# Patient Record
Sex: FEMALE | Race: OTHER | Employment: OTHER | URBAN - METROPOLITAN AREA
[De-identification: names, ages, dates, MRNs, and addresses within clinical notes are randomized per-mention and may not be internally consistent; named-entity substitution may affect disease eponyms.]

---

## 2018-04-01 ENCOUNTER — HOSPITAL ENCOUNTER (EMERGENCY)
Age: 28
Discharge: HOME | End: 2018-04-01
Attending: EMERGENCY MEDICINE | Admitting: EMERGENCY MEDICINE
Payer: OTHER GOVERNMENT

## 2018-04-01 VITALS
HEIGHT: 68 IN | OXYGEN SATURATION: 100 % | HEART RATE: 70 BPM | TEMPERATURE: 98.3 F | DIASTOLIC BLOOD PRESSURE: 80 MMHG | SYSTOLIC BLOOD PRESSURE: 118 MMHG | RESPIRATION RATE: 18 BRPM | WEIGHT: 163 LBS | BODY MASS INDEX: 24.71 KG/M2

## 2018-04-01 DIAGNOSIS — R10.84 ABDOMINAL PAIN, GENERALIZED: ICD-10-CM

## 2018-04-01 DIAGNOSIS — N30.00 ACUTE CYSTITIS WITHOUT HEMATURIA: Primary | ICD-10-CM

## 2018-04-01 LAB
ALBUMIN SERPL-MCNC: 3.4 G/DL (ref 3.4–5)
ALBUMIN/GLOB SERPL: 0.9 {RATIO} (ref 0.8–1.7)
ALP SERPL-CCNC: 50 U/L (ref 45–117)
ALT SERPL-CCNC: 44 U/L (ref 13–56)
ANION GAP SERPL CALC-SCNC: 8 MMOL/L (ref 3–18)
APPEARANCE UR: CLEAR
AST SERPL-CCNC: 27 U/L (ref 15–37)
BACTERIA URNS QL MICRO: ABNORMAL /HPF
BASOPHILS # BLD: 0 K/UL (ref 0–0.06)
BASOPHILS NFR BLD: 0 % (ref 0–2)
BILIRUB SERPL-MCNC: 0.3 MG/DL (ref 0.2–1)
BILIRUB UR QL: NEGATIVE
BUN SERPL-MCNC: 16 MG/DL (ref 7–18)
BUN/CREAT SERPL: 15 (ref 12–20)
CALCIUM SERPL-MCNC: 8.7 MG/DL (ref 8.5–10.1)
CHLORIDE SERPL-SCNC: 110 MMOL/L (ref 100–108)
CO2 SERPL-SCNC: 28 MMOL/L (ref 21–32)
COLOR UR: YELLOW
CREAT SERPL-MCNC: 1.05 MG/DL (ref 0.6–1.3)
DIFFERENTIAL METHOD BLD: ABNORMAL
EOSINOPHIL # BLD: 0.1 K/UL (ref 0–0.4)
EOSINOPHIL NFR BLD: 1 % (ref 0–5)
EPITH CASTS URNS QL MICRO: ABNORMAL /LPF (ref 0–5)
ERYTHROCYTE [DISTWIDTH] IN BLOOD BY AUTOMATED COUNT: 12 % (ref 11.6–14.5)
GLOBULIN SER CALC-MCNC: 3.8 G/DL (ref 2–4)
GLUCOSE SERPL-MCNC: 79 MG/DL (ref 74–99)
GLUCOSE UR STRIP.AUTO-MCNC: NEGATIVE MG/DL
HCT VFR BLD AUTO: 35.4 % (ref 35–45)
HGB BLD-MCNC: 12.1 G/DL (ref 12–16)
HGB UR QL STRIP: NEGATIVE
KETONES UR QL STRIP.AUTO: NEGATIVE MG/DL
LEUKOCYTE ESTERASE UR QL STRIP.AUTO: ABNORMAL
LIPASE SERPL-CCNC: 80 U/L (ref 73–393)
LYMPHOCYTES # BLD: 1.7 K/UL (ref 0.9–3.6)
LYMPHOCYTES NFR BLD: 33 % (ref 21–52)
MAGNESIUM SERPL-MCNC: 1.8 MG/DL (ref 1.6–2.6)
MCH RBC QN AUTO: 30.1 PG (ref 24–34)
MCHC RBC AUTO-ENTMCNC: 34.2 G/DL (ref 31–37)
MCV RBC AUTO: 88.1 FL (ref 74–97)
MONOCYTES # BLD: 0.5 K/UL (ref 0.05–1.2)
MONOCYTES NFR BLD: 9 % (ref 3–10)
NEUTS SEG # BLD: 2.9 K/UL (ref 1.8–8)
NEUTS SEG NFR BLD: 57 % (ref 40–73)
NITRITE UR QL STRIP.AUTO: NEGATIVE
PH UR STRIP: 8.5 [PH] (ref 5–8)
PLATELET # BLD AUTO: 156 K/UL (ref 135–420)
PMV BLD AUTO: 8.9 FL (ref 9.2–11.8)
POTASSIUM SERPL-SCNC: 4.5 MMOL/L (ref 3.5–5.5)
PROT SERPL-MCNC: 7.2 G/DL (ref 6.4–8.2)
PROT UR STRIP-MCNC: NEGATIVE MG/DL
RBC # BLD AUTO: 4.02 M/UL (ref 4.2–5.3)
RBC #/AREA URNS HPF: ABNORMAL /HPF (ref 0–5)
SERVICE CMNT-IMP: NORMAL
SODIUM SERPL-SCNC: 146 MMOL/L (ref 136–145)
SP GR UR REFRACTOMETRY: 1.01 (ref 1–1.03)
UROBILINOGEN UR QL STRIP.AUTO: 1 EU/DL (ref 0.2–1)
WBC # BLD AUTO: 5.1 K/UL (ref 4.6–13.2)
WBC URNS QL MICRO: ABNORMAL /HPF (ref 0–5)
WET PREP GENITAL: NORMAL
WET PREP GENITAL: NORMAL

## 2018-04-01 PROCEDURE — 80053 COMPREHEN METABOLIC PANEL: CPT | Performed by: EMERGENCY MEDICINE

## 2018-04-01 PROCEDURE — 99284 EMERGENCY DEPT VISIT MOD MDM: CPT

## 2018-04-01 PROCEDURE — 85025 COMPLETE CBC W/AUTO DIFF WBC: CPT | Performed by: EMERGENCY MEDICINE

## 2018-04-01 PROCEDURE — 83735 ASSAY OF MAGNESIUM: CPT | Performed by: EMERGENCY MEDICINE

## 2018-04-01 PROCEDURE — 87491 CHLMYD TRACH DNA AMP PROBE: CPT | Performed by: EMERGENCY MEDICINE

## 2018-04-01 PROCEDURE — 87210 SMEAR WET MOUNT SALINE/INK: CPT | Performed by: EMERGENCY MEDICINE

## 2018-04-01 PROCEDURE — 81001 URINALYSIS AUTO W/SCOPE: CPT | Performed by: EMERGENCY MEDICINE

## 2018-04-01 PROCEDURE — 87086 URINE CULTURE/COLONY COUNT: CPT | Performed by: EMERGENCY MEDICINE

## 2018-04-01 PROCEDURE — 83690 ASSAY OF LIPASE: CPT | Performed by: EMERGENCY MEDICINE

## 2018-04-01 RX ORDER — NITROFURANTOIN 25; 75 MG/1; MG/1
100 CAPSULE ORAL 2 TIMES DAILY
Qty: 10 CAP | Refills: 0 | Status: SHIPPED | OUTPATIENT
Start: 2018-04-01 | End: 2018-04-06

## 2018-04-01 NOTE — ED TRIAGE NOTES
Pt to ED via EMS for abdominal pain onset today at 1520 while watching TV. Pt denies N/V/D, reports pain is to the middle of her abdomen, rated 6/10. Sepsis Screening completed    (  )Patient meets SIRS criteria. ( x )Patient does not meet SIRS criteria.       SIRS Criteria is achieved when two or more of the following are present   Temperature < 96.8°F (36°C) or > 100.9°F (38.3°C)   Heart Rate > 90 beats per minute   Respiratory Rate > 20 breaths per minute   WBC count > 12,000 or <4,000 or > 10% bands

## 2018-04-01 NOTE — ED PROVIDER NOTES
EMERGENCY DEPARTMENT HISTORY AND PHYSICAL EXAM    Date: 4/1/2018  Patient Name: Maged George    History of Presenting Illness     Chief Complaint   Patient presents with    Abdominal Pain         History Provided By: Patient    Chief Complaint: abd pain  Duration: 1.5 Hours  Timing:  Improving  Location: cental abd  Quality: Sharp  Severity: Moderate  Associated Symptoms: syncope and pelvic pain    Additional History (Context):   5:00 PM  Jewel Marcus is a 33 y.o. female who presents to the emergency department C/O improving sharp central abd pain that radiates into back onset 1.5 hours ago after having sex. Associated sxs include pelvic pain. Pt mentions similar sx's while in [employer] training. LMP was 2 weeks ago. Pt was a virgin until one week ago, pt had sex one hour prior to symptom onset. Pt denies diarrhea, nausea, vomiting, abnormal vaginal discharge or bleeding, Hx of ovarian cyst, and any other sxs or complaints. She currently has no pain and feels back to normal.    PCP: Deyanira Loco MD        Past History     Past Medical History:  History reviewed. No pertinent past medical history. Past Surgical History:  History reviewed. No pertinent surgical history. Family History:  History reviewed. No pertinent family history. Social History:  Social History   Substance Use Topics    Smoking status: Never Smoker    Smokeless tobacco: Never Used    Alcohol use No       Allergies:  No Known Allergies      Review of Systems   Review of Systems   Gastrointestinal: Positive for abdominal pain. Negative for diarrhea, nausea and vomiting. Genitourinary: Positive for pelvic pain. Negative for vaginal bleeding and vaginal discharge. Neurological: Positive for syncope. All other systems reviewed and are negative.       Physical Exam     Vitals:    04/01/18 1626 04/01/18 1819   BP: 122/72 118/80   Pulse: (!) 58 70   Resp: 18 18   Temp: 98.3 °F (36.8 °C)    SpO2: 97% 100%   Weight: 73.9 kg (163 lb) Height: 5' 8\" (1.727 m)      Physical Exam   Nursing note and vitals reviewed. Constitutional: Alert. Well appearing, no acute distress  Head: Normocephalic, Atraumatic  Eyes: Pupils are equal, round, and reactive to light, EOMI  ENT: Moist mucous membranes, oropharynx clear. Neck: Supple, non-tender  Cardiovascular: Regular rate and rhythm, no murmurs, rubs, or gallops  Chest: Normal work of breathing and chest excursion bilaterally. No reproducible chest tenderness. Lungs: Clear to ausculation bilaterally. Abdomen: Soft, non tender, non distended, normoactive bowel sounds  Back: No evidence of trauma or deformity. No CVA Tenderness.   Extremities: No evidence of trauma or deformity, no LE edema  Skin: Warm and dry  Neuro: Alert and appropriate, facial movement symmetric, normal speech, strength and sensation full and symmetric bilaterally, normal gait, normal coordination  Psychiatric: Normal mood and affect     Diagnostic Study Results     Labs -     Recent Results (from the past 12 hour(s))   URINALYSIS W/ RFLX MICROSCOPIC    Collection Time: 04/01/18  4:30 PM   Result Value Ref Range    Color YELLOW      Appearance CLEAR      Specific gravity 1.015 1.005 - 1.030      pH (UA) 8.5 (H) 5.0 - 8.0      Protein NEGATIVE  NEG mg/dL    Glucose NEGATIVE  NEG mg/dL    Ketone NEGATIVE  NEG mg/dL    Bilirubin NEGATIVE  NEG      Blood NEGATIVE  NEG      Urobilinogen 1.0 0.2 - 1.0 EU/dL    Nitrites NEGATIVE  NEG      Leukocyte Esterase SMALL (A) NEG     URINE MICROSCOPIC ONLY    Collection Time: 04/01/18  4:30 PM   Result Value Ref Range    WBC 4 to 10 0 - 5 /hpf    RBC 0 to 3 0 - 5 /hpf    Epithelial cells 4+ 0 - 5 /lpf    Bacteria 1+ (A) NEG /hpf   CBC WITH AUTOMATED DIFF    Collection Time: 04/01/18  4:52 PM   Result Value Ref Range    WBC 5.1 4.6 - 13.2 K/uL    RBC 4.02 (L) 4.20 - 5.30 M/uL    HGB 12.1 12.0 - 16.0 g/dL    HCT 35.4 35.0 - 45.0 %    MCV 88.1 74.0 - 97.0 FL    MCH 30.1 24.0 - 34.0 PG    MCHC 34.2 31.0 - 37.0 g/dL    RDW 12.0 11.6 - 14.5 %    PLATELET 574 112 - 539 K/uL    MPV 8.9 (L) 9.2 - 11.8 FL    NEUTROPHILS 57 40 - 73 %    LYMPHOCYTES 33 21 - 52 %    MONOCYTES 9 3 - 10 %    EOSINOPHILS 1 0 - 5 %    BASOPHILS 0 0 - 2 %    ABS. NEUTROPHILS 2.9 1.8 - 8.0 K/UL    ABS. LYMPHOCYTES 1.7 0.9 - 3.6 K/UL    ABS. MONOCYTES 0.5 0.05 - 1.2 K/UL    ABS. EOSINOPHILS 0.1 0.0 - 0.4 K/UL    ABS. BASOPHILS 0.0 0.0 - 0.06 K/UL    DF AUTOMATED     MAGNESIUM    Collection Time: 04/01/18  4:52 PM   Result Value Ref Range    Magnesium 1.8 1.6 - 2.6 mg/dL   METABOLIC PANEL, COMPREHENSIVE    Collection Time: 04/01/18  4:52 PM   Result Value Ref Range    Sodium 146 (H) 136 - 145 mmol/L    Potassium 4.5 3.5 - 5.5 mmol/L    Chloride 110 (H) 100 - 108 mmol/L    CO2 28 21 - 32 mmol/L    Anion gap 8 3.0 - 18 mmol/L    Glucose 79 74 - 99 mg/dL    BUN 16 7.0 - 18 MG/DL    Creatinine 1.05 0.6 - 1.3 MG/DL    BUN/Creatinine ratio 15 12 - 20      GFR est AA >60 >60 ml/min/1.73m2    GFR est non-AA >60 >60 ml/min/1.73m2    Calcium 8.7 8.5 - 10.1 MG/DL    Bilirubin, total 0.3 0.2 - 1.0 MG/DL    ALT (SGPT) 44 13 - 56 U/L    AST (SGOT) 27 15 - 37 U/L    Alk. phosphatase 50 45 - 117 U/L    Protein, total 7.2 6.4 - 8.2 g/dL    Albumin 3.4 3.4 - 5.0 g/dL    Globulin 3.8 2.0 - 4.0 g/dL    A-G Ratio 0.9 0.8 - 1.7     LIPASE    Collection Time: 04/01/18  4:52 PM   Result Value Ref Range    Lipase 80 73 - 393 U/L   WET PREP    Collection Time: 04/01/18  5:15 PM   Result Value Ref Range    Special Requests: NO SPECIAL REQUESTS      Wet prep NO YEAST,TRICHOMONAS OR CLUE CELLS NOTED      Wet prep NO FUNGAL ELEMENTS SEEN         Radiologic Studies -  No orders to display     CT Results  (Last 48 hours)    None        CXR Results  (Last 48 hours)    None          Medications given in the ED-  Medications - No data to display      Medical Decision Making   I am the first provider for this patient.     I reviewed the vital signs, available nursing notes, past medical history, past surgical history, family history and social history. Vital Signs-Reviewed the patient's vital signs. Pulse Oximetry Analysis - 97% on RA     Procedures:  Procedures   PROCEDURE NOTE - PELVIC EXAM:    5:11 PM  Performed by: Louis Oliver MD  Pelvic exam was performed speculum. Further findings noted in physical exam. GC, chlamydia and wet prep collected and sent to lab. Procedure chaperoned by nursing staff. The procedure took 1-15 minutes, and pt tolerated poorly. Difficulty exam due to significant pt anxiety. I was unable to visualize cervix or perform bimanual exam  Written by Zelda Rivas ED Scribe, as dictated by Louis Oliver MD.        ED Course:   5:00 PM Initial assessment performed. The patients presenting problems have been discussed, and they are in agreement with the care plan formulated and outlined with them. I have encouraged them to ask questions as they arise throughout their visit. Diagnosis and Disposition     Discussion: 33 y.o female present for abd pain occurring after intercourse, resolved by the time pt arrived to ER. Abd exam benign. Labs reassuring for NAP. I was unable to complete full pelvic exam due to significant anxiety on the part of the patient and the cervix was not visualized at all. She understands importance of PCP and OBGYN follow up. She has UTI which will be treated. Return precautions provided. DISCHARGE NOTE:  6:25 PM  Trey George's  results have been reviewed with her. She has been counseled regarding her diagnosis, treatment, and plan. She verbally conveys understanding and agreement of the signs, symptoms, diagnosis, treatment and prognosis and additionally agrees to follow up as discussed. She also agrees with the care-plan and conveys that all of her questions have been answered.   I have also provided discharge instructions for her that include: educational information regarding their diagnosis and treatment, and list of reasons why they would want to return to the ED prior to their follow-up appointment, should her condition change. She has been provided with education for proper emergency department utilization. CLINICAL IMPRESSION:    1. Acute cystitis without hematuria    2. Abdominal pain, generalized        PLAN:  1. D/C Home  2. Discharge Medication List as of 4/1/2018  6:07 PM      START taking these medications    Details   nitrofurantoin, macrocrystal-monohydrate, (MACROBID) 100 mg capsule Take 1 Cap by mouth two (2) times a day for 5 days. , Print, Disp-10 Cap, R-0           3. Follow-up Information     Follow up With Details Comments Contact Info    [facility] [place] Schedule an appointment as soon as possible for a visit in 2 days  [phone]    [facility] EMERGENCY DEPT  As needed, If symptoms worsen [address] [address]  [phone]        _______________________________    Attestations: This note is prepared by Niels Starr, acting as Scribe for Joann Anglin MD.    Joann Anglin MD:  The scribe's documentation has been prepared under my direction and personally reviewed by me in its entirety.   I confirm that the note above accurately reflects all work, treatment, procedures, and medical decision making performed by me.  _______________________________

## 2018-04-01 NOTE — DISCHARGE INSTRUCTIONS
Abdominal Pain: Care Instructions  Your Care Instructions    Abdominal pain has many possible causes. Some aren't serious and get better on their own in a few days. Others need more testing and treatment. If your pain continues or gets worse, you need to be rechecked and may need more tests to find out what is wrong. You may need surgery to correct the problem. Don't ignore new symptoms, such as fever, nausea and vomiting, urination problems, pain that gets worse, and dizziness. These may be signs of a more serious problem. Your doctor may have recommended a follow-up visit in the next 8 to 12 hours. If you are not getting better, you may need more tests or treatment. The doctor has checked you carefully, but problems can develop later. If you notice any problems or new symptoms, get medical treatment right away. Follow-up care is a key part of your treatment and safety. Be sure to make and go to all appointments, and call your doctor if you are having problems. It's also a good idea to know your test results and keep a list of the medicines you take. How can you care for yourself at home? · Rest until you feel better. · To prevent dehydration, drink plenty of fluids, enough so that your urine is light yellow or clear like water. Choose water and other caffeine-free clear liquids until you feel better. If you have kidney, heart, or liver disease and have to limit fluids, talk with your doctor before you increase the amount of fluids you drink. · If your stomach is upset, eat mild foods, such as rice, dry toast or crackers, bananas, and applesauce. Try eating several small meals instead of two or three large ones. · Wait until 48 hours after all symptoms have gone away before you have spicy foods, alcohol, and drinks that contain caffeine. · Do not eat foods that are high in fat. · Avoid anti-inflammatory medicines such as aspirin, ibuprofen (Advil, Motrin), and naproxen (Aleve).  These can cause stomach upset. Talk to your doctor if you take daily aspirin for another health problem. When should you call for help? Call 911 anytime you think you may need emergency care. For example, call if:  ? · You passed out (lost consciousness). ? · You pass maroon or very bloody stools. ? · You vomit blood or what looks like coffee grounds. ? · You have new, severe belly pain. ?Call your doctor now or seek immediate medical care if:  ? · Your pain gets worse, especially if it becomes focused in one area of your belly. ? · You have a new or higher fever. ? · Your stools are black and look like tar, or they have streaks of blood. ? · You have unexpected vaginal bleeding. ? · You have symptoms of a urinary tract infection. These may include:  ¨ Pain when you urinate. ¨ Urinating more often than usual.  ¨ Blood in your urine. ? · You are dizzy or lightheaded, or you feel like you may faint. ? Watch closely for changes in your health, and be sure to contact your doctor if:  ? · You are not getting better after 1 day (24 hours). Where can you learn more? Go to http://gabrielleBig Rivermatt.info/. Enter D588 in the search box to learn more about \"Abdominal Pain: Care Instructions. \"  Current as of: March 20, 2017  Content Version: 11.4  © 0133-7741 [employer] Care instructions adapted under license by [employer] (which disclaims liability or warranty for this information). If you have questions about a medical condition or this instruction, always ask your healthcare professional. [place] [address] any warranty or liability for your use of this information. Urinary Tract Infection in Women: Care Instructions  Your Care Instructions    A urinary tract infection, or UTI, is a general term for an infection anywhere between the kidneys and the urethra (where urine comes out). Most UTIs are bladder infections.  They often cause pain or burning when you urinate. UTIs are caused by bacteria and can be cured with antibiotics. Be sure to complete your treatment so that the infection goes away. Follow-up care is a key part of your treatment and safety. Be sure to make and go to all appointments, and call your doctor if you are having problems. It's also a good idea to know your test results and keep a list of the medicines you take. How can you care for yourself at home? · Take your antibiotics as directed. Do not stop taking them just because you feel better. You need to take the full course of antibiotics. · Drink extra water and other fluids for the next day or two. This may help wash out the bacteria that are causing the infection. (If you have kidney, heart, or liver disease and have to limit fluids, talk with your doctor before you increase your fluid intake.)  · Avoid drinks that are carbonated or have caffeine. They can irritate the bladder. · Urinate often. Try to empty your bladder each time. · To relieve pain, take a hot bath or lay a heating pad set on low over your lower belly or genital area. Never go to sleep with a heating pad in place. To prevent UTIs  · Drink plenty of water each day. This helps you urinate often, which clears bacteria from your system. (If you have kidney, heart, or liver disease and have to limit fluids, talk with your doctor before you increase your fluid intake.)  · Urinate when you need to. · Urinate right after you have sex. · Change sanitary pads often. · Avoid douches, bubble baths, feminine hygiene sprays, and other feminine hygiene products that have deodorants. · After going to the bathroom, wipe from front to back. When should you call for help? Call your doctor now or seek immediate medical care if:  ? · Symptoms such as fever, chills, nausea, or vomiting get worse or appear for the first time. ? · You have new pain in your back just below your rib cage. This is called flank pain.    ? · There is new blood or pus in your urine. ? · You have any problems with your antibiotic medicine. ? Watch closely for changes in your health, and be sure to contact your doctor if:  ? · You are not getting better after taking an antibiotic for 2 days. ? · Your symptoms go away but then come back. Where can you learn more? Go to http://gabrielle-matt.info/. Enter Z242 in the search box to learn more about \"Urinary Tract Infection in Women: Care Instructions. \"  Current as of: May 12, 2017  Content Version: 11.4  © 8327-4222 [employer]. Care instructions adapted under license by [employer] (which disclaims liability or warranty for this information). If you have questions about a medical condition or this instruction, always ask your healthcare professional. [address] any warranty or liability for your use of this information.

## 2018-04-01 NOTE — ED NOTES
Pt resting in stretcher in NAD at this time. Call light within reach.  at bedside. Pt currently denies abdominal pain.

## 2018-04-02 LAB
BACTERIA SPEC CULT: NORMAL
C TRACH RRNA SPEC QL NAA+PROBE: NEGATIVE
N GONORRHOEA RRNA SPEC QL NAA+PROBE: NEGATIVE
SERVICE CMNT-IMP: NORMAL
SPECIMEN SOURCE: NORMAL

## 2018-10-02 ENCOUNTER — HOSPITAL ENCOUNTER (EMERGENCY)
Age: 28
Discharge: HOME | End: 2018-10-02
Attending: EMERGENCY MEDICINE
Payer: OTHER GOVERNMENT

## 2018-10-02 ENCOUNTER — APPOINTMENT (OUTPATIENT)
Dept: ULTRASOUND IMAGING | Age: 28
End: 2018-10-02
Attending: NURSE PRACTITIONER
Payer: OTHER GOVERNMENT

## 2018-10-02 VITALS
TEMPERATURE: 98.1 F | RESPIRATION RATE: 16 BRPM | BODY MASS INDEX: 28.79 KG/M2 | WEIGHT: 190 LBS | HEIGHT: 68 IN | OXYGEN SATURATION: 100 % | SYSTOLIC BLOOD PRESSURE: 111 MMHG | DIASTOLIC BLOOD PRESSURE: 71 MMHG | HEART RATE: 84 BPM

## 2018-10-02 DIAGNOSIS — N83.202 CYST OF LEFT OVARY: ICD-10-CM

## 2018-10-02 DIAGNOSIS — O26.891 ABDOMINAL PAIN DURING PREGNANCY IN FIRST TRIMESTER: Primary | ICD-10-CM

## 2018-10-02 DIAGNOSIS — R10.9 ABDOMINAL PAIN DURING PREGNANCY IN FIRST TRIMESTER: Primary | ICD-10-CM

## 2018-10-02 DIAGNOSIS — O41.8X10 SUBCHORIONIC HEMORRHAGE OF PLACENTA IN FIRST TRIMESTER, SINGLE OR UNSPECIFIED FETUS: ICD-10-CM

## 2018-10-02 DIAGNOSIS — O46.8X1 SUBCHORIONIC HEMORRHAGE OF PLACENTA IN FIRST TRIMESTER, SINGLE OR UNSPECIFIED FETUS: ICD-10-CM

## 2018-10-02 LAB
ABO + RH BLD: NORMAL
ALBUMIN SERPL-MCNC: 3.9 G/DL (ref 3.4–5)
ALBUMIN/GLOB SERPL: 0.9 {RATIO} (ref 0.8–1.7)
ALP SERPL-CCNC: 48 U/L (ref 45–117)
ALT SERPL-CCNC: 27 U/L (ref 13–56)
ANION GAP SERPL CALC-SCNC: 9 MMOL/L (ref 3–18)
APPEARANCE UR: CLEAR
AST SERPL-CCNC: 18 U/L (ref 15–37)
BACTERIA URNS QL MICRO: ABNORMAL /HPF
BASOPHILS # BLD: 0 K/UL (ref 0–0.1)
BASOPHILS NFR BLD: 0 % (ref 0–2)
BILIRUB SERPL-MCNC: 0.4 MG/DL (ref 0.2–1)
BILIRUB UR QL: NEGATIVE
BLOOD GROUP ANTIBODIES SERPL: NORMAL
BUN SERPL-MCNC: 9 MG/DL (ref 7–18)
BUN/CREAT SERPL: 13 (ref 12–20)
CALCIUM SERPL-MCNC: 9.4 MG/DL (ref 8.5–10.1)
CHLORIDE SERPL-SCNC: 99 MMOL/L (ref 100–108)
CO2 SERPL-SCNC: 27 MMOL/L (ref 21–32)
COLOR UR: YELLOW
CREAT SERPL-MCNC: 0.72 MG/DL (ref 0.6–1.3)
DIFFERENTIAL METHOD BLD: ABNORMAL
EOSINOPHIL # BLD: 0 K/UL (ref 0–0.4)
EOSINOPHIL NFR BLD: 0 % (ref 0–5)
EPITH CASTS URNS QL MICRO: ABNORMAL /LPF (ref 0–5)
ERYTHROCYTE [DISTWIDTH] IN BLOOD BY AUTOMATED COUNT: 12.3 % (ref 11.6–14.5)
GLOBULIN SER CALC-MCNC: 4.5 G/DL (ref 2–4)
GLUCOSE SERPL-MCNC: 85 MG/DL (ref 74–99)
GLUCOSE UR STRIP.AUTO-MCNC: NEGATIVE MG/DL
HCG SERPL QL: POSITIVE
HCG SERPL-ACNC: ABNORMAL MIU/ML (ref 1–6)
HCT VFR BLD AUTO: 38.1 % (ref 35–45)
HGB BLD-MCNC: 13.6 G/DL (ref 12–16)
HGB UR QL STRIP: NEGATIVE
KETONES UR QL STRIP.AUTO: NEGATIVE MG/DL
LEUKOCYTE ESTERASE UR QL STRIP.AUTO: ABNORMAL
LIPASE SERPL-CCNC: 73 U/L (ref 73–393)
LYMPHOCYTES # BLD: 1.1 K/UL (ref 0.9–3.6)
LYMPHOCYTES NFR BLD: 19 % (ref 21–52)
MCH RBC QN AUTO: 30.8 PG (ref 24–34)
MCHC RBC AUTO-ENTMCNC: 35.7 G/DL (ref 31–37)
MCV RBC AUTO: 86.4 FL (ref 74–97)
MONOCYTES # BLD: 0.3 K/UL (ref 0.05–1.2)
MONOCYTES NFR BLD: 5 % (ref 3–10)
NEUTS SEG # BLD: 4.3 K/UL (ref 1.8–8)
NEUTS SEG NFR BLD: 76 % (ref 40–73)
NITRITE UR QL STRIP.AUTO: NEGATIVE
PH UR STRIP: 6.5 [PH] (ref 5–8)
PLATELET # BLD AUTO: 171 K/UL (ref 135–420)
PMV BLD AUTO: 8.8 FL (ref 9.2–11.8)
POTASSIUM SERPL-SCNC: 3.9 MMOL/L (ref 3.5–5.5)
PROT SERPL-MCNC: 8.4 G/DL (ref 6.4–8.2)
PROT UR STRIP-MCNC: NEGATIVE MG/DL
RBC # BLD AUTO: 4.41 M/UL (ref 4.2–5.3)
RBC #/AREA URNS HPF: ABNORMAL /HPF (ref 0–5)
SERVICE CMNT-IMP: NORMAL
SODIUM SERPL-SCNC: 135 MMOL/L (ref 136–145)
SP GR UR REFRACTOMETRY: 1.02 (ref 1–1.03)
SPECIMEN EXP DATE BLD: NORMAL
UROBILINOGEN UR QL STRIP.AUTO: 1 EU/DL (ref 0.2–1)
WBC # BLD AUTO: 5.7 K/UL (ref 4.6–13.2)
WBC URNS QL MICRO: ABNORMAL /HPF (ref 0–5)
WET PREP GENITAL: NORMAL

## 2018-10-02 PROCEDURE — 99284 EMERGENCY DEPT VISIT MOD MDM: CPT

## 2018-10-02 PROCEDURE — 80053 COMPREHEN METABOLIC PANEL: CPT | Performed by: NURSE PRACTITIONER

## 2018-10-02 PROCEDURE — 87491 CHLMYD TRACH DNA AMP PROBE: CPT | Performed by: NURSE PRACTITIONER

## 2018-10-02 PROCEDURE — 87086 URINE CULTURE/COLONY COUNT: CPT | Performed by: NURSE PRACTITIONER

## 2018-10-02 PROCEDURE — 81001 URINALYSIS AUTO W/SCOPE: CPT | Performed by: EMERGENCY MEDICINE

## 2018-10-02 PROCEDURE — 86901 BLOOD TYPING SEROLOGIC RH(D): CPT | Performed by: NURSE PRACTITIONER

## 2018-10-02 PROCEDURE — 85025 COMPLETE CBC W/AUTO DIFF WBC: CPT | Performed by: NURSE PRACTITIONER

## 2018-10-02 PROCEDURE — 84703 CHORIONIC GONADOTROPIN ASSAY: CPT | Performed by: NURSE PRACTITIONER

## 2018-10-02 PROCEDURE — 87210 SMEAR WET MOUNT SALINE/INK: CPT | Performed by: NURSE PRACTITIONER

## 2018-10-02 PROCEDURE — 83690 ASSAY OF LIPASE: CPT | Performed by: NURSE PRACTITIONER

## 2018-10-02 PROCEDURE — 84702 CHORIONIC GONADOTROPIN TEST: CPT | Performed by: NURSE PRACTITIONER

## 2018-10-02 PROCEDURE — 76817 TRANSVAGINAL US OBSTETRIC: CPT

## 2018-10-02 RX ORDER — ACETAMINOPHEN 325 MG/1
650 TABLET ORAL
Status: DISCONTINUED | OUTPATIENT
Start: 2018-10-02 | End: 2018-10-02 | Stop reason: HOSPADM

## 2018-10-02 NOTE — DISCHARGE INSTRUCTIONS
Belly Pain in Pregnancy: Care Instructions  Your Care Instructions    When you're pregnant, any belly pain can be a worry. You may not want to call your doctor about every pain you have. But you don't want to miss something that is dangerous for you or your baby. Even if it feels familiar, belly pain can mean something new when you're pregnant. It's important to know when to call your doctor. It will also help to know how to care for yourself at home when your pain is not caused by anything harmful. · When belly pain is more severe or constant, see a doctor right away. · If you're sure your belly pain is a sign of labor, call your doctor. · When belly pain is brief, it's usually a normal part of pregnancy. It might be related to changes in the growing uterus. Or it could be the stretching of ligaments called round ligaments. These ligaments help support the uterus. Round ligament pain can be on either side of your belly. It can also be felt in your hips or groin. Follow-up care is a key part of your treatment and safety. Be sure to make and go to all appointments, and call your doctor if you are having problems. It's also a good idea to know your test results and keep a list of the medicines you take. How can you tell if belly pain is a sign of labor? When belly pain is caused by labor, it can feel like mild or menstrual-like cramps in your lower belly. These cramps are probably contractions. They can happen in your second or third trimester. You may also have:  · A steady, dull ache in your lower back, pelvis, or thighs. · A feeling of pressure in your pelvis or lower belly. · Changes in your vaginal discharge or a sudden release of fluid from the vagina. If you think you are in labor, call your doctor. How can you care for yourself at home? When belly pain is mild and is not a symptom of labor:  · Rest until you feel better. · Take a warm bath.   · Think about what you drink and eat:  ¨ Drink plenty of fluids. Choose water and other caffeine-free clear liquids until you feel better. ¨ Try eating small, frequent meals. If your stomach is upset, try bland, low-fat foods like plain rice, broiled chicken, toast, and yogurt. · Think about how you move if you are having brief pains from stretching of the round ligaments. ¨ Try gentle stretching. ¨ Move a little more slowly when turning in bed or getting up from a chair, so those ligaments don't stretch quickly. ¨ Lean forward a bit if you think you are going to cough or sneeze. When should you call for help? Call 911 anytime you think you may need emergency care. For example, call if:    · You have sudden, severe pain in your belly.     · You have severe vaginal bleeding.    Call your doctor now or seek immediate medical care if:    · You have new or worse belly pain or cramping.     · You have any vaginal bleeding.     · You have a fever.     · You have symptoms of preeclampsia, such as:  ¨ Sudden swelling of your face, hands, or feet. ¨ New vision problems (such as dimness or blurring). ¨ A severe headache.     · You think that you may be in labor. This means that you've had at least 8 contractions within 1 hour or at least 4 contractions within 20 minutes, even after you change your position and drink fluids.     · You have symptoms of a urinary tract infection. These may include:  ¨ Pain or burning when you urinate. ¨ A frequent need to urinate without being able to pass much urine. ¨ Pain in the flank, which is just below the rib cage and above the waist on either side of the back. ¨ Blood in your urine.    Watch closely for changes in your health, and be sure to contact your doctor if you are worried about your or your baby's health. Where can you learn more? Go to http://gabrielle-matt.info/. Enter 194 [id number] [id number] in the search box to learn more about \"Belly Pain in Pregnancy: Care Instructions. \"  Current as of: November 21, 2017  Content Version: 11.7  © 0248-5405 [employer]. Care instructions adapted under license by [employer] (which disclaims liability or warranty for this information). If you have questions about a medical condition or this instruction, always ask your healthcare professional. [address] any warranty or liability for your use of this information. Subchorionic Hematoma: Care Instructions  Your Care Instructions    A subchorionic hematoma or hemorrhage is bleeding under one of the membranes (chorion) that surrounds the embryo inside the uterus. It is a common cause of bleeding in early pregnancy. The main symptom is vaginal bleeding. But some women don't have symptoms. They may find out they have a hematoma during an ultrasound test.  In most cases, the bleeding goes away on its own. Most women go on to have a healthy baby. But in some cases, the bleeding is a sign of a miscarriage or other problem with the pregnancy. Your doctor may want to do a follow-up ultrasound. Follow-up care is a key part of your treatment and safety. Be sure to make and go to all appointments, and call your doctor if you are having problems. It's also a good idea to know your test results and keep a list of the medicines you take. How can you care for yourself at home? · Keep track of any bleeding, and follow the guidelines for when to call your doctor. · Keep in mind that some bleeding during the first trimester or an abnormal finding on an ultrasound may:  ¨ Not cause any problems for you or the baby. ¨ Turn out to be something more serious. But if this happens, it's best to find out early. Then you and your doctor can manage any complications sooner rather than later. When should you call for help? Call 911 anytime you think you may need emergency care. For example, call if:    · You have sudden, severe pain in your belly or pelvis.     · You passed out (lost consciousness).   · You have severe vaginal bleeding.    Call your doctor now or seek immediate medical care if:    · You are dizzy or lightheaded, or you feel like you may faint.     · You have new or increased pain in your belly or pelvis.     · You have new or more vaginal bleeding.     · You have pain in the vaginal area.     · You have a fever.     · You think you may have passed tissue. Save any tissue that you pass. Take it to your doctor's office as soon as you can.    Watch closely for changes in your health, and be sure to contact your doctor if:    · You have new or worse vaginal symptoms, such as pain, itching, or a discharge.     · You do not get better as expected. Where can you learn more? Go to http://gabrielle-matt.info/. Teodoro Yen in the search box to learn more about \"Subchorionic Hematoma: Care Instructions. \"  Current as of: November 21, 2017  Content Version: 11.7  © 5907-3745 [employer]. Care instructions adapted under license by [facility] (which disclaims liability or warranty for this information). If you have questions about a medical condition or this instruction, always ask your healthcare professional. [address] any warranty or liability for your use of this information.

## 2018-10-02 NOTE — ED PROVIDER NOTES
EMERGENCY DEPARTMENT HISTORY AND PHYSICAL EXAM 
 
Date: 10/2/2018 Patient Name: [facility] History of Presenting Illness Chief Complaint Patient presents with  Pregnancy  Pelvic Pain History Provided By: Patient Chief Complaint: Pelvic pain Duration: 1 Hours Timing:  Acute and Intermittent Location: Pelvis worse on right Quality: Trini Matthews Severity: 9 out of 10 Modifying Factors: Some alleviation with certain positions. Associated Symptoms: nausea and urinary frequency Additional History (Context):  
9:07 AM 
[facility] is a 33 y.o. female who is 8 weeks pregnant () with no significant PMHx who presents to the emergency department C/O intermittent sharp pelvic pain (9/10) worse on the right onset 1 hour ago. Associated sxs include nausea and urinary frequency. Some alleviation with certain positions. Pt reports she was performing pregnancy PT and afterwards began to experience severe pelvic pain. Pt reports a pulling sensation from her uterus. Reports Hx of right dermoid cyst. Pt is on her second round of antibiotics for a UTI. Is currently on her 4th day of antibiotics. Reports similar sxs when she was 17years old but was never evaluated. Pt has had prenatal care at [facility] and has been taking her prenatal vitamins. Pt denies vomiting, vaginal bleeding, hematuria, dysuria, chest pain, SOB, and any other sxs or complaints. PCP: Pete Caballero MD 
 
Current Outpatient Prescriptions Medication Sig Dispense Refill  amoxicillin/potassium clav (AUGMENTIN PO) Take  by mouth. Indications: recent uti Past History Past Medical History: 
History reviewed. No pertinent past medical history. Past Surgical History: 
History reviewed. No pertinent surgical history. Family History: 
History reviewed. No pertinent family history. Social History: 
Social History Substance Use Topics  Smoking status: Never Smoker  Smokeless tobacco: Never Used  Alcohol use No  
 
 
Allergies: 
No Known Allergies Review of Systems Review of Systems Respiratory: Negative for shortness of breath. Cardiovascular: Negative for chest pain. Gastrointestinal: Positive for nausea. Negative for vomiting. Genitourinary: Positive for frequency and pelvic pain. Negative for dysuria, hematuria and vaginal bleeding. All other systems reviewed and are negative. Physical Exam  
 
Vitals:  
 10/02/18 8437 BP: 111/71 Pulse: 84 Resp: 16 Temp: 98.1 °F (36.7 °C) SpO2: 100% Weight: 86.2 kg (190 lb) Height: 5' 8\" (1.727 m) Physical Exam  
Constitutional: She is oriented to person, place, and time. She appears well-developed and well-nourished. HENT:  
Head: Normocephalic. Eyes: Conjunctivae are normal.  
Neck: Normal range of motion. Cardiovascular: Normal rate and regular rhythm. Pulmonary/Chest: Effort normal and breath sounds normal.  
Abdominal: Soft. Bowel sounds are normal. She exhibits no distension. There is no tenderness. There is no rebound. Musculoskeletal: Normal range of motion. Neurological: She is alert and oriented to person, place, and time. Skin: Skin is warm and dry. Nursing note and vitals reviewed. Diagnostic Study Results Labs - Recent Results (from the past 12 hour(s)) CBC WITH AUTOMATED DIFF Collection Time: 10/02/18  9:15 AM  
Result Value Ref Range WBC 5.7 4.6 - 13.2 K/uL  
 RBC 4.41 4.20 - 5.30 M/uL  
 HGB 13.6 12.0 - 16.0 g/dL HCT 38.1 35.0 - 45.0 % MCV 86.4 74.0 - 97.0 FL  
 MCH 30.8 24.0 - 34.0 PG  
 MCHC 35.7 31.0 - 37.0 g/dL  
 RDW 12.3 11.6 - 14.5 % PLATELET 022 464 - 482 K/uL MPV 8.8 (L) 9.2 - 11.8 FL  
 NEUTROPHILS 76 (H) 40 - 73 % LYMPHOCYTES 19 (L) 21 - 52 % MONOCYTES 5 3 - 10 % EOSINOPHILS 0 0 - 5 % BASOPHILS 0 0 - 2 %  
 ABS. NEUTROPHILS 4.3 1.8 - 8.0 K/UL  
 ABS. LYMPHOCYTES 1.1 0.9 - 3.6 K/UL  
 ABS. MONOCYTES 0.3 0.05 - 1.2 K/UL ABS. EOSINOPHILS 0.0 0.0 - 0.4 K/UL  
 ABS. BASOPHILS 0.0 0.0 - 0.1 K/UL  
 DF AUTOMATED METABOLIC PANEL, COMPREHENSIVE Collection Time: 10/02/18  9:15 AM  
Result Value Ref Range Sodium 135 (L) 136 - 145 mmol/L Potassium 3.9 3.5 - 5.5 mmol/L Chloride 99 (L) 100 - 108 mmol/L  
 CO2 27 21 - 32 mmol/L Anion gap 9 3.0 - 18 mmol/L Glucose 85 74 - 99 mg/dL BUN 9 7.0 - 18 MG/DL Creatinine 0.72 0.6 - 1.3 MG/DL  
 BUN/Creatinine ratio 13 12 - 20 GFR est AA >60 >60 ml/min/1.73m2 GFR est non-AA >60 >60 ml/min/1.73m2 Calcium 9.4 8.5 - 10.1 MG/DL Bilirubin, total 0.4 0.2 - 1.0 MG/DL  
 ALT (SGPT) 27 13 - 56 U/L  
 AST (SGOT) 18 15 - 37 U/L Alk. phosphatase 48 45 - 117 U/L Protein, total 8.4 (H) 6.4 - 8.2 g/dL Albumin 3.9 3.4 - 5.0 g/dL Globulin 4.5 (H) 2.0 - 4.0 g/dL A-G Ratio 0.9 0.8 - 1.7 LIPASE Collection Time: 10/02/18  9:15 AM  
Result Value Ref Range Lipase 73 73 - 393 U/L  
BETA HCG, QT Collection Time: 10/02/18  9:15 AM  
Result Value Ref Range Beta HCG, QT 518842 (H) 1.0 - 6.0 MIU/ML  
TYPE & SCREEN Collection Time: 10/02/18  9:15 AM  
Result Value Ref Range Crossmatch Expiration 10/05/2018 ABO/Rh(D) O POSITIVE Antibody screen NEG   
URINALYSIS W/ RFLX MICROSCOPIC Collection Time: 10/02/18  9:16 AM  
Result Value Ref Range Color YELLOW Appearance CLEAR Specific gravity 1.019 1.005 - 1.030    
 pH (UA) 6.5 5.0 - 8.0 Protein NEGATIVE  NEG mg/dL Glucose NEGATIVE  NEG mg/dL Ketone NEGATIVE  NEG mg/dL Bilirubin NEGATIVE  NEG Blood NEGATIVE  NEG Urobilinogen 1.0 0.2 - 1.0 EU/dL Nitrites NEGATIVE  NEG Leukocyte Esterase SMALL (A) NEG URINE MICROSCOPIC ONLY Collection Time: 10/02/18  9:16 AM  
Result Value Ref Range WBC 0 to 2 0 - 5 /hpf  
 RBC NONE 0 - 5 /hpf Epithelial cells 4+ 0 - 5 /lpf Bacteria FEW (A) NEG /hpf WET PREP  Collection Time: 10/02/18  9:50 AM  
 Result Value Ref Range Special Requests: NO SPECIAL REQUESTS Wet prep NO YEAST,TRICHOMONAS OR CLUE CELLS NOTED Radiologic Studies -  
US UTS TRANSVAGINAL OB Final Result IMPRESSION: 
 
1. Single live intrauterine gestation with estimated gestational age of 10 weeks 0 days, congruent with gestational age by last menstrual period. 2. Small area of subchorionic hemorrhage as described. 3. Nonvisualization of the right ovary. 4. Normal blood flow to the left ovary with likely left ovarian corpus luteum. As read by the radiologist.  
 
CT Results  (Last 48 hours) None CXR Results  (Last 48 hours) None Medications given in the ED- Medications - No data to display Medical Decision Making I am the first provider for this patient. I reviewed the vital signs, available nursing notes, past medical history, past surgical history, family history and social history. Vital Signs-Reviewed the patient's vital signs. Pulse Oximetry Analysis - 100% on RA Records Reviewed: Nursing Notes and Old Medical Records Pt was seen 4/1/18 complaining of central abdominal radiating to her back after having sex. Recent loss of virginity. Difficult to perform pelvic exam due to pts anxiety. Dxed with UTI and started on Macrobid. To follow up with . Provider Notes (Medical Decision Making): 33 y.o female, who is 8 weeks pregnant, presents with generalized abdominal pain after PT this morning. She is currently being treated with Augmentin for a UTI. Wet prep negative. No bleeding noted on exam. US confirms live IUP and small subchorionic hemorrhage. She was given note for no further PT and told pelvic rest until seen by OB/GYN. She understands reasons to return and is offering no questions or concerns. Procedures: 
Pelvic Exam 
Date/Time: 10/2/2018 9:33 AM 
Performed by: NP 
Type of exam performed: bimanual and speculum. External genitalia appearance: normal.   
Vaginal exam:  discharge. The amount of discharge was:  moderate. The discharge was thick and white. Cervical exam:  normal, no cervical motion tenderness and os closed. Specimen(s) collected:  chlamydia, GC and vaginal culture. Bimanual exam:  normal.   
Patient tolerance: Patient tolerated the procedure well with no immediate complications ED Course:  
9:07 AM Initial assessment performed. The patients presenting problems have been discussed, and they are in agreement with the care plan formulated and outlined with them. I have encouraged them to ask questions as they arise throughout their visit. 9:29 AM Pt has had a total of 2 US's at [place]. First one did not show an IUP but according to the pt second [address] did show US but is unsure if they saw a heartbeat. 10:45 AM Pt and  updated on results. Understands what strict pelvic rest means. Understands reasons to return, to follow up with OB/GYN and is offering no questions or questions at this time. Diagnosis and Disposition DISCHARGE NOTE: 
10:45 AM 
Yuliana Cole George's  results have been reviewed with her. She has been counseled regarding her diagnosis, treatment, and plan. She verbally conveys understanding and agreement of the signs, symptoms, diagnosis, treatment and prognosis and additionally agrees to follow up as discussed. She also agrees with the care-plan and conveys that all of her questions have been answered. I have also provided discharge instructions for her that include: educational information regarding their diagnosis and treatment, and list of reasons why they would want to return to the ED prior to their follow-up appointment, should her condition change. She has been provided with education for proper emergency department utilization. CLINICAL IMPRESSION: 
 
1. Abdominal pain during pregnancy in first trimester 2. Subchorionic hemorrhage of placenta in first trimester, single or unspecified fetus 3. Cyst of left ovary PLAN: 
1. D/C Home 2. Discharge Medication List as of 10/2/2018 10:45 AM  
  
 
3. Follow-up Information Follow up With Details Comments Contact Syeda SHARMA Schedule an appointment as soon as possible for a visit in 3 days For OB/GYN and PCP follow up. [phone] [facility] EMERGENCY DEPT Go to As needed, If symptoms worsen [address] [place] [id number] 
[phone]  
  
 
_______________________________ Attestations: This note is prepared by Bambi Dominguez, acting as Scribe for Sima Morales [facility]. Sima Morales [facility]:  The scribe's documentation has been prepared under my direction and personally reviewed by me in its entirety. I confirm that the note above accurately reflects all work, treatment, procedures, and medical decision making performed by me. 
_______________________________

## 2018-10-02 NOTE — ED TRIAGE NOTES
Pt approx 8 weeks pregnant, not seen by OB yet; Pt states she was doing pregnancy PT and afterwards started having \"uterus\" pain;  Pt points to lower pelvis area;   Denies vomiting, vaginal bleeding;

## 2018-10-02 NOTE — LETTER
[facility] [place] 
[facility] 
[place] [id number] [phone] Work Note Date: 10/2/2018 To Whom It May concern: 
 
Nancy Dumont was seen and treated today in the emergency room by the following provider(s): 
Attending Provider: Alphia Bence, MD 
Nurse Practitioner: Harper Snellen, NP. Lissette Cary Doris may not return to PT until cleared by OB/GYN Sincerely, Tamara Mcmahon FNP-BC

## 2020-07-13 ENCOUNTER — HOSPITAL ENCOUNTER (EMERGENCY)
Age: 30
Discharge: HOME | End: 2020-07-14
Attending: OBSTETRICS & GYNECOLOGY | Admitting: OBSTETRICS & GYNECOLOGY
Payer: OTHER GOVERNMENT

## 2020-07-13 VITALS
SYSTOLIC BLOOD PRESSURE: 129 MMHG | OXYGEN SATURATION: 99 % | HEIGHT: 68 IN | HEART RATE: 85 BPM | BODY MASS INDEX: 31.83 KG/M2 | RESPIRATION RATE: 18 BRPM | WEIGHT: 210 LBS | DIASTOLIC BLOOD PRESSURE: 64 MMHG | TEMPERATURE: 98.2 F

## 2020-07-13 LAB
APPEARANCE UR: CLEAR
BILIRUB UR QL: NEGATIVE
COLOR UR: ABNORMAL
GLUCOSE UR QL STRIP.AUTO: NEGATIVE MG/DL
KETONES UR-MCNC: NEGATIVE MG/DL
LEUKOCYTE ESTERASE UR QL STRIP: NEGATIVE
NITRITE UR QL: NEGATIVE
PH UR: 6.5 [PH] (ref 5–9)
PROT UR QL: NEGATIVE MG/DL
RBC # UR STRIP: NEGATIVE /UL
SERVICE CMNT-IMP: ABNORMAL
SP GR UR: 1.02 (ref 1–1.02)
UROBILINOGEN UR QL: 1 EU/DL (ref 0.2–1)

## 2020-07-13 PROCEDURE — 59025 FETAL NON-STRESS TEST: CPT

## 2020-07-13 PROCEDURE — 99283 EMERGENCY DEPT VISIT LOW MDM: CPT

## 2020-07-13 PROCEDURE — 81003 URINALYSIS AUTO W/O SCOPE: CPT

## 2020-07-14 PROCEDURE — 59025 FETAL NON-STRESS TEST: CPT

## 2020-07-14 PROCEDURE — 99283 EMERGENCY DEPT VISIT LOW MDM: CPT

## 2020-07-14 NOTE — DISCHARGE INSTRUCTIONS
Counting Your Baby's Kicks: Care Instructions  Your Care Instructions     Counting your baby's kicks is one way your doctor can tell that your baby is healthy. Most women--especially in a first pregnancy--feel their baby move for the first time between 16 and 22 weeks. The movement may feel like flutters rather than kicks. Your baby may move more at certain times of the day. When you are active, you may notice less kicking than when you are resting. At your prenatal visits, your doctor will ask whether the baby is active. In your last trimester, your doctor may ask you to count the number of times you feel your baby move. Follow-up care is a key part of your treatment and safety. Be sure to make and go to all appointments, and call your doctor if you are having problems. It's also a good idea to know your test results and keep a list of the medicines you take. How do you count fetal kicks? · A common method of checking your baby's movement is to count the number of kicks or moves you feel in 1 hour. Ten movements (such as kicks, flutters, or rolls) in 1 hour are normal. Some doctors suggest that you count in the morning until you get to 10 movements. Then you can quit for that day and start again the next day. · Pick your baby's most active time of day to count. This may be any time from morning to evening. · If you do not feel 10 movements in an hour, your baby may be sleeping. Wait for the next hour and count again. When should you call for help? Call your doctor now or seek immediate medical care if:  · You noticed that your baby has stopped moving or is moving much less than normal.  Watch closely for changes in your health, and be sure to contact your doctor if you have any problems. Where can you learn more? Go to http://www.gray.com/  Enter [id number] in the search box to learn more about \"Counting Your Baby's Kicks: Care Instructions. \"  Current as of: February 11, 2020               Content Version: 12.5  © 5915-2161 [employer]. Care instructions adapted under license by [employer] (which disclaims liability or warranty for this information). If you have questions about a medical condition or this instruction, always ask your healthcare professional. [address] any warranty or liability for your use of this information.

## 2020-07-14 NOTE — PROGRESS NOTES
Gaurav Velazquez [address] [place] [address] Dr. Emily Allen paged through [employer]. 0010 Dr. Emily Allen returned page and informed pt's c/o, ua results, no ctx noted, audible fetal movement. Orders received to check cervix and DC to home if closed. 0015 SVE is closed/thick and high. 1703  Written and verbal DC instructions given to include daily fetal kick counts,  labor precautions. Verbalizes understanding. All questions answered. Up to get dressed. 0020 DC'd to home ambulatory.

## 2021-04-26 ENCOUNTER — APPOINTMENT (OUTPATIENT)
Dept: CT IMAGING | Age: 31
End: 2021-04-26
Attending: EMERGENCY MEDICINE
Payer: OTHER GOVERNMENT

## 2021-04-26 ENCOUNTER — APPOINTMENT (OUTPATIENT)
Dept: GENERAL RADIOLOGY | Age: 31
End: 2021-04-26
Attending: EMERGENCY MEDICINE
Payer: OTHER GOVERNMENT

## 2021-04-26 ENCOUNTER — HOSPITAL ENCOUNTER (EMERGENCY)
Age: 31
Discharge: HOME | End: 2021-04-27
Attending: EMERGENCY MEDICINE
Payer: OTHER GOVERNMENT

## 2021-04-26 ENCOUNTER — HOSPITAL ENCOUNTER (EMERGENCY)
Dept: ULTRASOUND IMAGING | Age: 31
Discharge: HOME | End: 2021-04-26
Attending: EMERGENCY MEDICINE
Payer: OTHER GOVERNMENT

## 2021-04-26 DIAGNOSIS — K80.50 BILIARY COLIC: Primary | ICD-10-CM

## 2021-04-26 DIAGNOSIS — N30.00 ACUTE CYSTITIS WITHOUT HEMATURIA: ICD-10-CM

## 2021-04-26 LAB
ALBUMIN SERPL-MCNC: 3.9 G/DL (ref 3.4–5)
ALBUMIN/GLOB SERPL: 1.1 {RATIO} (ref 0.8–1.7)
ALP SERPL-CCNC: 70 U/L (ref 45–117)
ALT SERPL-CCNC: 43 U/L (ref 13–56)
ANION GAP SERPL CALC-SCNC: 8 MMOL/L (ref 3–18)
APPEARANCE UR: CLEAR
AST SERPL-CCNC: 32 U/L (ref 10–38)
BACTERIA URNS QL MICRO: ABNORMAL /HPF
BASOPHILS # BLD: 0 K/UL (ref 0–0.1)
BASOPHILS NFR BLD: 0 % (ref 0–2)
BILIRUB SERPL-MCNC: 0.5 MG/DL (ref 0.2–1)
BILIRUB UR QL: NEGATIVE
BUN SERPL-MCNC: 9 MG/DL (ref 7–18)
BUN/CREAT SERPL: 11 (ref 12–20)
CALCIUM SERPL-MCNC: 9.2 MG/DL (ref 8.5–10.1)
CHLORIDE SERPL-SCNC: 106 MMOL/L (ref 100–111)
CK MB CFR SERPL CALC: NORMAL % (ref 0–4)
CK MB CFR SERPL CALC: NORMAL % (ref 0–4)
CK MB SERPL-MCNC: <1 NG/ML (ref 5–25)
CK MB SERPL-MCNC: <1 NG/ML (ref 5–25)
CK SERPL-CCNC: 147 U/L (ref 26–192)
CK SERPL-CCNC: 179 U/L (ref 26–192)
CO2 SERPL-SCNC: 28 MMOL/L (ref 21–32)
COLOR UR: YELLOW
CREAT SERPL-MCNC: 0.85 MG/DL (ref 0.6–1.3)
D DIMER PPP FEU-MCNC: 0.55 UG/ML(FEU)
DIFFERENTIAL METHOD BLD: ABNORMAL
EOSINOPHIL # BLD: 0 K/UL (ref 0–0.4)
EOSINOPHIL NFR BLD: 1 % (ref 0–5)
EPITH CASTS URNS QL MICRO: ABNORMAL /LPF (ref 0–5)
ERYTHROCYTE [DISTWIDTH] IN BLOOD BY AUTOMATED COUNT: 12.7 % (ref 11.6–14.5)
GLOBULIN SER CALC-MCNC: 3.4 G/DL (ref 2–4)
GLUCOSE SERPL-MCNC: 86 MG/DL (ref 74–99)
GLUCOSE UR STRIP.AUTO-MCNC: NEGATIVE MG/DL
HCG SERPL QL: NEGATIVE
HCT VFR BLD AUTO: 37.7 % (ref 35–45)
HGB BLD-MCNC: 12.7 G/DL (ref 12–16)
HGB UR QL STRIP: NEGATIVE
KETONES UR QL STRIP.AUTO: NEGATIVE MG/DL
LEUKOCYTE ESTERASE UR QL STRIP.AUTO: ABNORMAL
LIPASE SERPL-CCNC: 54 U/L (ref 73–393)
LYMPHOCYTES # BLD: 1 K/UL (ref 0.9–3.6)
LYMPHOCYTES NFR BLD: 20 % (ref 21–52)
MCH RBC QN AUTO: 30 PG (ref 24–34)
MCHC RBC AUTO-ENTMCNC: 33.7 G/DL (ref 31–37)
MCV RBC AUTO: 88.9 FL (ref 74–97)
MONOCYTES # BLD: 0.3 K/UL (ref 0.05–1.2)
MONOCYTES NFR BLD: 6 % (ref 3–10)
NEUTS SEG # BLD: 3.5 K/UL (ref 1.8–8)
NEUTS SEG NFR BLD: 72 % (ref 40–73)
NITRITE UR QL STRIP.AUTO: NEGATIVE
PH UR STRIP: 8 [PH] (ref 5–8)
PLATELET # BLD AUTO: 213 K/UL (ref 135–420)
PMV BLD AUTO: 9.3 FL (ref 9.2–11.8)
POTASSIUM SERPL-SCNC: 4 MMOL/L (ref 3.5–5.5)
PROT SERPL-MCNC: 7.3 G/DL (ref 6.4–8.2)
PROT UR STRIP-MCNC: NEGATIVE MG/DL
RBC # BLD AUTO: 4.24 M/UL (ref 4.2–5.3)
RBC #/AREA URNS HPF: ABNORMAL /HPF (ref 0–5)
SODIUM SERPL-SCNC: 142 MMOL/L (ref 136–145)
SP GR UR REFRACTOMETRY: 1.01 (ref 1–1.03)
TROPONIN I SERPL-MCNC: <0.02 NG/ML (ref 0–0.04)
TROPONIN I SERPL-MCNC: <0.02 NG/ML (ref 0–0.04)
UROBILINOGEN UR QL STRIP.AUTO: 1 EU/DL (ref 0.2–1)
WBC # BLD AUTO: 4.8 K/UL (ref 4.6–13.2)
WBC URNS QL MICRO: ABNORMAL /HPF (ref 0–5)

## 2021-04-26 PROCEDURE — C9113 INJ PANTOPRAZOLE SODIUM, VIA: HCPCS | Performed by: EMERGENCY MEDICINE

## 2021-04-26 PROCEDURE — 74011250636 HC RX REV CODE- 250/636: Performed by: EMERGENCY MEDICINE

## 2021-04-26 PROCEDURE — 96375 TX/PRO/DX INJ NEW DRUG ADDON: CPT

## 2021-04-26 PROCEDURE — 84703 CHORIONIC GONADOTROPIN ASSAY: CPT

## 2021-04-26 PROCEDURE — 80053 COMPREHEN METABOLIC PANEL: CPT

## 2021-04-26 PROCEDURE — 74011000250 HC RX REV CODE- 250: Performed by: EMERGENCY MEDICINE

## 2021-04-26 PROCEDURE — 96374 THER/PROPH/DIAG INJ IV PUSH: CPT

## 2021-04-26 PROCEDURE — 99285 EMERGENCY DEPT VISIT HI MDM: CPT

## 2021-04-26 PROCEDURE — 85379 FIBRIN DEGRADATION QUANT: CPT

## 2021-04-26 PROCEDURE — 71275 CT ANGIOGRAPHY CHEST: CPT

## 2021-04-26 PROCEDURE — 76705 ECHO EXAM OF ABDOMEN: CPT

## 2021-04-26 PROCEDURE — 85025 COMPLETE CBC W/AUTO DIFF WBC: CPT

## 2021-04-26 PROCEDURE — 82553 CREATINE MB FRACTION: CPT

## 2021-04-26 PROCEDURE — 74011000636 HC RX REV CODE- 636: Performed by: EMERGENCY MEDICINE

## 2021-04-26 PROCEDURE — 71045 X-RAY EXAM CHEST 1 VIEW: CPT

## 2021-04-26 PROCEDURE — 93005 ELECTROCARDIOGRAM TRACING: CPT

## 2021-04-26 PROCEDURE — 74011250637 HC RX REV CODE- 250/637: Performed by: EMERGENCY MEDICINE

## 2021-04-26 PROCEDURE — 81001 URINALYSIS AUTO W/SCOPE: CPT

## 2021-04-26 PROCEDURE — 83690 ASSAY OF LIPASE: CPT

## 2021-04-26 RX ORDER — PANTOPRAZOLE SODIUM 40 MG/10ML
40 INJECTION, POWDER, LYOPHILIZED, FOR SOLUTION INTRAVENOUS
Status: COMPLETED | OUTPATIENT
Start: 2021-04-26 | End: 2021-04-26

## 2021-04-26 RX ORDER — ONDANSETRON 2 MG/ML
4 INJECTION INTRAMUSCULAR; INTRAVENOUS
Status: COMPLETED | OUTPATIENT
Start: 2021-04-26 | End: 2021-04-26

## 2021-04-26 RX ORDER — DICYCLOMINE HYDROCHLORIDE 10 MG/1
10 CAPSULE ORAL ONCE
Status: DISCONTINUED | OUTPATIENT
Start: 2021-04-26 | End: 2021-04-27

## 2021-04-26 RX ADMIN — IOPAMIDOL 80 ML: 755 INJECTION, SOLUTION INTRAVENOUS at 23:53

## 2021-04-26 RX ADMIN — PANTOPRAZOLE SODIUM 40 MG: 40 INJECTION, POWDER, FOR SOLUTION INTRAVENOUS at 19:55

## 2021-04-26 RX ADMIN — ALUMINUM HYDROXIDE, MAGNESIUM HYDROXIDE, AND SIMETHICONE 40 ML: 200; 200; 20 SUSPENSION ORAL at 19:55

## 2021-04-26 RX ADMIN — SODIUM CHLORIDE 1000 ML: 900 INJECTION, SOLUTION INTRAVENOUS at 19:56

## 2021-04-26 RX ADMIN — ONDANSETRON 4 MG: 2 INJECTION INTRAMUSCULAR; INTRAVENOUS at 19:56

## 2021-04-26 NOTE — ED PROVIDER NOTES
EMERGENCY DEPARTMENT HISTORY AND PHYSICAL EXAM    Date: 4/26/2021  Patient Name: Leila George    History of Presenting Illness     Chief Complaint   Patient presents with    Abdominal Pain         History Provided By: Patient    7:25 PM  Leila George is a 35 y.o. female with PMHX of ?sickle cell (patient declines medical history) who presents to the emergency department C/O upper abdominal pain beginning around 6 PM associated nausea and vomiting. Patient reports that for the past 6 months she has had intermittent upper abdominal pain associated with nausea and vomiting. Reports that symptoms typically anthony at home with no intervention. States that this evening the pain was so severe that she had an episode where she passed out for a few seconds due to the pain. She denies any chest pain, shortness of breath, difficulty breathing, dizziness or lightheadedness. Patient denies any head trauma. When EMS arrived she was able to get up and onto the stretcher. She reports that she has had 3 episodes of vomiting. She denies any chest pain, shortness of breath, fever, cough, diarrhea, urinary symptoms. No family cardiac history. PCP: Deyanira Loco MD    Current Outpatient Medications   Medication Sig Dispense Refill    dicyclomine (BENTYL) 10 mg capsule Take 1 Cap by mouth every six (6) hours as needed for Abdominal Cramps for up to 14 doses. 14 Cap 0    pantoprazole (Protonix) 40 mg tablet Take 1 Tab by mouth daily for 10 days. 10 Tab 0    cephALEXin (Keflex) 500 mg capsule Take 1 Cap by mouth two (2) times a day for 7 days. 14 Cap 0    PNV No12-Iron-FA-DSS-OM-3 29 mg iron-1 mg -50 mg CPKD Take 2 Tabs by mouth. Indications: pregnancy         Past History     Past Medical History:  Past Medical History:   Diagnosis Date    Sickle cell disease ([facility] [address]        Past Surgical History:  No past surgical history on file.     Family History:  Family History   Problem Relation Age of Onset    Psychiatric Disorder Maternal Grandmother        Social History:  Social History     Tobacco Use    Smoking status: Never Smoker    Smokeless tobacco: Never Used   Substance Use Topics    Alcohol use: No    Drug use: No       Allergies:  No Known Allergies      Review of Systems   Review of Systems   Constitutional: Negative for chills and fever. Respiratory: Negative for shortness of breath. Cardiovascular: Negative for chest pain. Gastrointestinal: Positive for abdominal pain, nausea and vomiting. Negative for diarrhea. All other systems reviewed and are negative.       Physical Exam     Vitals:    04/26/21 2230 04/26/21 2300 04/26/21 2330 04/27/21 0000   BP: (!) 108/90 (!) 106/53 117/71 113/61   Pulse: (!) 53 (!) 56 70    Resp: 17 13 23    Temp:       SpO2: 98% 97% 99% 97%   Weight:       Height:         Physical Exam    Nursing notes and vital signs reviewed  Constitutional: Non toxic appearing, moderate distress secondary to pain  Head: Normocephalic, Atraumatic  Eyes: EOMI  Neck: Supple  Cardiovascular: Regular rate and rhythm, no murmurs, rubs, or gallops  Chest: Normal work of breathing and chest excursion bilaterally  Lungs: Clear to ausculation bilaterally  Abdomen: Soft, right upper quadrant and epigastric tenderness, no McBurney's or right lower quadrant tenderness, no rebound or guarding, non distended, normoactive bowel sounds  Back: No evidence of trauma or deformity  Extremities: No evidence of trauma or deformity, no LE edema  Skin: Warm and dry, normal cap refill  Neuro: Alert and appropriate, CN intact, normal speech, strength and sensation full and symmetric bilaterally, normal gait, normal coordination  Psychiatric: Normal mood and affect      Diagnostic Study Results     Labs -     Recent Results (from the past 12 hour(s))   EKG, 12 LEAD, INITIAL    Collection Time: 04/26/21  7:32 PM   Result Value Ref Range    Ventricular Rate 58 BPM    Atrial Rate 58 BPM    P-R Interval 170 ms    QRS Duration 84 ms Q-T Interval 464 ms    QTC Calculation (Bezet) 455 ms    Calculated P Axis 56 degrees    Calculated R Axis 65 degrees    Calculated T Axis 20 degrees    Diagnosis       Sinus bradycardia with sinus arrhythmia  Cannot rule out Anterior infarct , age undetermined  Abnormal ECG  No previous ECGs available     CBC WITH AUTOMATED DIFF    Collection Time: 04/26/21  7:50 PM   Result Value Ref Range    WBC 4.8 4.6 - 13.2 K/uL    RBC 4.24 4.20 - 5.30 M/uL    HGB 12.7 12.0 - 16.0 g/dL    HCT 37.7 35.0 - 45.0 %    MCV 88.9 74.0 - 97.0 FL    MCH 30.0 24.0 - 34.0 PG    MCHC 33.7 31.0 - 37.0 g/dL    RDW 12.7 11.6 - 14.5 %    PLATELET 721 778 - 632 K/uL    MPV 9.3 9.2 - 11.8 FL    NEUTROPHILS 72 40 - 73 %    LYMPHOCYTES 20 (L) 21 - 52 %    MONOCYTES 6 3 - 10 %    EOSINOPHILS 1 0 - 5 %    BASOPHILS 0 0 - 2 %    ABS. NEUTROPHILS 3.5 1.8 - 8.0 K/UL    ABS. LYMPHOCYTES 1.0 0.9 - 3.6 K/UL    ABS. MONOCYTES 0.3 0.05 - 1.2 K/UL    ABS. EOSINOPHILS 0.0 0.0 - 0.4 K/UL    ABS. BASOPHILS 0.0 0.0 - 0.1 K/UL    DF AUTOMATED     METABOLIC PANEL, COMPREHENSIVE    Collection Time: 04/26/21  7:50 PM   Result Value Ref Range    Sodium 142 136 - 145 mmol/L    Potassium 4.0 3.5 - 5.5 mmol/L    Chloride 106 100 - 111 mmol/L    CO2 28 21 - 32 mmol/L    Anion gap 8 3.0 - 18 mmol/L    Glucose 86 74 - 99 mg/dL    BUN 9 7.0 - 18 MG/DL    Creatinine 0.85 0.6 - 1.3 MG/DL    BUN/Creatinine ratio 11 (L) 12 - 20      GFR est AA >60 >60 ml/min/1.73m2    GFR est non-AA >60 >60 ml/min/1.73m2    Calcium 9.2 8.5 - 10.1 MG/DL    Bilirubin, total 0.5 0.2 - 1.0 MG/DL    ALT (SGPT) 43 13 - 56 U/L    AST (SGOT) 32 10 - 38 U/L    Alk.  phosphatase 70 45 - 117 U/L    Protein, total 7.3 6.4 - 8.2 g/dL    Albumin 3.9 3.4 - 5.0 g/dL    Globulin 3.4 2.0 - 4.0 g/dL    A-G Ratio 1.1 0.8 - 1.7     LIPASE    Collection Time: 04/26/21  7:50 PM   Result Value Ref Range    Lipase 54 (L) 73 - 393 U/L   CARDIAC PANEL,(CK, CKMB & TROPONIN)    Collection Time: 04/26/21  7:50 PM   Result Value Ref Range    CK - MB <1.0 <3.6 ng/ml    CK-MB Index  0.0 - 4.0 %     CALCULATION NOT PERFORMED WHEN RESULT IS BELOW LINEAR LIMIT     26 - 192 U/L    Troponin-I, QT <0.02 0.0 - 0.045 NG/ML   D DIMER    Collection Time: 04/26/21  7:50 PM   Result Value Ref Range    D DIMER 0.55 (H) <0.46 ug/ml(FEU)   HCG QL SERUM    Collection Time: 04/26/21  7:50 PM   Result Value Ref Range    HCG, Ql. Negative NEG     URINALYSIS W/ RFLX MICROSCOPIC    Collection Time: 04/26/21  8:00 PM   Result Value Ref Range    Color YELLOW      Appearance CLEAR      Specific gravity 1.014 1.005 - 1.030      pH (UA) 8.0 5.0 - 8.0      Protein Negative NEG mg/dL    Glucose Negative NEG mg/dL    Ketone Negative NEG mg/dL    Bilirubin Negative NEG      Blood Negative NEG      Urobilinogen 1.0 0.2 - 1.0 EU/dL    Nitrites Negative NEG      Leukocyte Esterase MODERATE (A) NEG     URINE MICROSCOPIC ONLY    Collection Time: 04/26/21  8:00 PM   Result Value Ref Range    WBC 0 to 3 0 - 5 /hpf    RBC 0 to 3 0 - 5 /hpf    Epithelial cells FEW 0 - 5 /lpf    Bacteria 1+ (A) NEG /hpf   CARDIAC PANEL,(CK, CKMB & TROPONIN)    Collection Time: 04/26/21 10:55 PM   Result Value Ref Range    CK - MB <1.0 <3.6 ng/ml    CK-MB Index  0.0 - 4.0 %     CALCULATION NOT PERFORMED WHEN RESULT IS BELOW LINEAR LIMIT     26 - 192 U/L    Troponin-I, QT <0.02 0.0 - 0.045 NG/ML       Radiologic Studies -   CTA CHEST W OR W WO CONT   Final Result      1. No evidence of pulmonary embolism. 2.  No active cardiopulmonary disease. US ABD LTD   Final Result      Numerous gallstones in the gallbladder without gallbladder wall thickening or   pericholecystic fluid. The stones appear mobile however if there is any clinical   concern for cystic duct obstruction then I would advise a hepatobiliary nuclear   medicine scan. There are mildly enlarged liver.       XR CHEST PORT   Final Result   No acute process        CT Results  (Last 48 hours)               04/26/21 [address] CONT Final result    Impression:      1. No evidence of pulmonary embolism. 2.  No active cardiopulmonary disease. Narrative:  EXAM: CTA chest       INDICATION: Near syncope. Pain. COMPARISON: None       TECHNIQUE: Axial CT imaging from the thoracic inlet through the diaphragm with   intravenous contrast. Coronal and sagittal MIP reformats were generated. Dose   reduction techniques used: automated exposure control, adjustment of the mAs   and/or kVp according to patient size, and iterative reconstruction techniques. Digital imaging and communications in Medicine (DICOM) format image data are   available to nonaffiliated external healthcare facilities or entities on a   secure, media free, reciprocally searchable basis with patient authorization for   at least 12 months after this study. _______________       FINDINGS:       EXAM QUALITY: Non-diagnostic/Adequate/Excellent       PULMONARY ARTERIES: No evidence of pulmonary embolism. MEDIASTINUM: Normal heart size. No evidence of right heart strain. Aorta is   unremarkable. No pericardial effusion. LUNGS: No suspicious nodule or mass. No abnormal opacities. PLEURA: Normal.       AIRWAY: Normal.       LYMPH NODES: No enlarged nodes. UPPER ABDOMEN: Unremarkable. OTHER: No acute or aggressive osseous abnormalities identified. _______________               CXR Results  (Last 48 hours)               04/26/21 2015  XR CHEST PORT Final result    Impression:  No acute process       Narrative:  EXAM:  AP Portable Chest X-ray 1 view        INDICATION: Abdominal pain and near syncope       COMPARISON: None       _______________       FINDINGS:  Heart and mediastinal contours are within normal limits for portable   radiograph. Lungs are clear of active disease. There are no pleural effusions. No acute osseous findings.        ________________                     Medications given in the ED-  Medications   sodium chloride 0.9 % bolus infusion 1,000 mL (0 mL IntraVENous IV Completed 4/26/21 2255)   pantoprazole (PROTONIX) injection 40 mg (40 mg IntraVENous Given 4/26/21 1955)   mylanta/viscous lidocaine (GI COCKTAIL) (40 mL Oral Given 4/26/21 1955)   ondansetron (ZOFRAN) injection 4 mg (4 mg IntraVENous Given 4/26/21 1956)   iopamidoL (ISOVUE-370) 76 % injection  mL (80 mL IntraVENous Given 4/26/21 5073)         Medical Decision Making   I am the first provider for this patient. I reviewed the vital signs, available nursing notes, past medical history, past surgical history, family history and social history. Vital Signs-Reviewed the patient's vital signs. Pulse Oximetry Analysis -97% on room air, not hypoxic     Cardiac Monitor:  Rate: 72 bpm  Rhythm: Sinus    EKG interpretation: (Preliminary)  EKG read by Dr. Ellen Johnson at 0579   Sinus bradycardia with sinus arrhythmia, rate 58, , QRS 84, QTc 455. No acute ischemia. No priors for comparison. Records Reviewed: Nursing Notes    Provider Notes (Medical Decision Making): Dante Lynne is a 35 y.o. female presenting with intermittent right upper quadrant/epigastric abdominal pain ongoing for the past few months. Patient reports 3 episodes of nausea and vomiting this evening associated with upper abdominal pain and an episode where she passed out for few seconds secondary to the pain with no head trauma. On exam she is in moderate distress secondary to pain right upper quadrant and epigastric tenderness on exam.  Heart regular rate and rhythm with clear breath sounds, neurologically intact. Will obtain lab work, right upper quadrant ultrasound, cardiac work-up, D-dimer, reassess. Procedures:  Procedures    ED Course:   Lab work with elevated D-dimer. Otherwise no hematologic or metabolic abnormalities. No leukocytosis.   Urinalysis positive for UTI.  9:33 PM:Numerous gallstones in the gallbladder without gallbladder wall thickening or pericholecystic fluid. The stones appear mobile however if there is any clinical concern for cystic duct obstruction then I would advise a hepatobiliary nuclear medicine scan. Patient seen and reassessed, resting comfortably. There is no epigastric or right upper quadrant tenderness. Abdomen is soft and nontender. Patient reports resolution of symptoms. Will consult with GI.    CONSULT NOTE:   1038 pm  Dr. Domitila Augustin spoke with Dr. Kerwin Rivera  Specialty: GI  Discussed pt's hx, disposition, and available diagnostic and imaging results over the telephone. Reviewed care plans. No acute intervention needed. Recommending general surgery consultation with outpatient follow-up and outpatient HIDA scan. CONSULT NOTE:   65  Dr. Domitila Augustin spoke with Dr. Haily Matthew  Specialty: General surgery  Discussed pt's hx, disposition, and available diagnostic and imaging results over the telephone. Reviewed care plans. In agreement with outpatient follow-up for biliary colic, likely will need outpatient elective cholecystectomy. Recommending Bentyl outpatient. 12:06 AM: CTA negative for PE or acute process. Patient sleeping heart rate in 40s to 50s however upon waking the patient up heart rate in the 70s. Given Bentyl. Tolerating p.o. with no episodes of nausea or vomiting. Discussed the need for outpatient follow-up with general surgery with the patient. Explained to the patient that she will likely need elective cholecystectomy given her persistent biliary colic. Will discharge with a prescription for Bentyl as well as Keflex for UTI. We will also give Protonix. No episodes of nausea or vomiting here. Abdomen soft and nontender. Strict return precautions given to the patient. Patient in agreement with discharge plan and return precautions. Offering no questions or complaints. Diagnosis and Disposition       DISCHARGE NOTE:    Salina George's  results have been reviewed with her.   She has been counseled regarding her diagnosis, treatment, and plan. She verbally conveys understanding and agreement of the signs, symptoms, diagnosis, treatment and prognosis and additionally agrees to follow up as discussed. She also agrees with the care-plan and conveys that all of her questions have been answered. I have also provided discharge instructions for her that include: educational information regarding their diagnosis and treatment, and list of reasons why they would want to return to the ED prior to their follow-up appointment, should her condition change. She has been provided with education for proper emergency department utilization. CLINICAL IMPRESSION:    1. Biliary colic    2. Acute cystitis without hematuria        PLAN:  1. D/C Home  2. Discharge Medication List as of 4/27/2021 12:47 AM      START taking these medications    Details   dicyclomine (BENTYL) 10 mg capsule Take 1 Cap by mouth every six (6) hours as needed for Abdominal Cramps for up to 14 doses. , Normal, Disp-14 Cap, R-0      pantoprazole (Protonix) 40 mg tablet Take 1 Tab by mouth daily for 10 days. , Normal, Disp-10 Tab, R-0      cephALEXin (Keflex) 500 mg capsule Take 1 Cap by mouth two (2) times a day for 7 days. , Normal, Disp-14 Cap, R-0         CONTINUE these medications which have NOT CHANGED    Details   PNV No12-Iron-FA-DSS-OM-3 29 mg iron-1 mg -50 mg CPKD Take 2 Tabs by mouth. Indications: pregnancy, Historical Med           3.    Follow-up Information     Follow up With Specialties Details Why Contact Info    Mariela Mejia DO Colon and Rectal Surgery Schedule an appointment as soon as possible for a visit   [address]      Juan M Louis MD Cardiology, Internal Medicine Schedule an appointment as soon as possible for a visit   [address]  [facility] [address] [address] [phone]      [facility] Emergency Medicine  If symptoms worsen [address] [place] [facility] [address]  [phone]        _______________________________      Please note that this dictation was completed with [employer], the computer voice recognition software. Quite often unanticipated grammatical, syntax, homophones, and other interpretive errors are inadvertently transcribed by the computer software. Please disregard these errors. Please excuse any errors that have escaped final proofreading.

## 2021-04-26 NOTE — ED TRIAGE NOTES
Pt arrives via ems from home, co abd pain, N/V. EMS reports that pt had a syncopal episode upon their arrival, pt was on the floor, was able to get up and get on stretcher. PT reports abd pain began around 1800, epigastrium, has been N, V x 3. Pt denies CP, SOB, fever, cough, diarrhea, urinary symptoms.  No prior medical hx

## 2021-04-27 VITALS
TEMPERATURE: 98.2 F | WEIGHT: 228.2 LBS | DIASTOLIC BLOOD PRESSURE: 61 MMHG | HEART RATE: 70 BPM | SYSTOLIC BLOOD PRESSURE: 113 MMHG | RESPIRATION RATE: 23 BRPM | OXYGEN SATURATION: 97 % | BODY MASS INDEX: 34.59 KG/M2 | HEIGHT: 68 IN

## 2021-04-27 LAB
ATRIAL RATE: 58 BPM
CALCULATED P AXIS, ECG09: 56 DEGREES
CALCULATED R AXIS, ECG10: 65 DEGREES
CALCULATED T AXIS, ECG11: 20 DEGREES
DIAGNOSIS, 93000: NORMAL
P-R INTERVAL, ECG05: 170 MS
Q-T INTERVAL, ECG07: 464 MS
QRS DURATION, ECG06: 84 MS
QTC CALCULATION (BEZET), ECG08: 455 MS
VENTRICULAR RATE, ECG03: 58 BPM

## 2021-04-27 RX ORDER — CEPHALEXIN 500 MG/1
500 CAPSULE ORAL 2 TIMES DAILY
Qty: 14 CAP | Refills: 0 | Status: SHIPPED | OUTPATIENT
Start: 2021-04-27 | End: 2021-05-04

## 2021-04-27 RX ORDER — DICYCLOMINE HYDROCHLORIDE 10 MG/1
10 CAPSULE ORAL
Qty: 14 CAP | Refills: 0 | Status: SHIPPED | OUTPATIENT
Start: 2021-04-27

## 2021-04-27 RX ORDER — PANTOPRAZOLE SODIUM 40 MG/1
40 TABLET, DELAYED RELEASE ORAL DAILY
Qty: 10 TAB | Refills: 0 | Status: SHIPPED | OUTPATIENT
Start: 2021-04-27 | End: 2021-05-07

## 2021-04-27 NOTE — ED NOTES
"Per Dr. Cosby \"I would be glad to refill this prescription when I see her next. She does not yet have an appointment.\"  Marly informed. I LMTCB with pt to get scheduled.   " Marta Martin RN reviewed discharge instructions with the patient. The patient verbalized understanding. Patient armband removed and shredded

## 2021-04-27 NOTE — DISCHARGE INSTRUCTIONS
Take Bentyl every 6 hours as needed for abdominal discomfort. Take Protonix daily. Take Keflex as directed. Eat a bland diet. Diet information attached. Follow-up outpatient with surgery. The information has been provided to you. Follow-up outpatient with cardiology. Return to the emergency department with any worsening abdominal pain, nausea, vomiting, fever, chills, feeling like you will pass out, chest pain, shortness of breath, or any other concerns.